# Patient Record
Sex: FEMALE | Race: OTHER | Employment: FULL TIME | URBAN - METROPOLITAN AREA
[De-identification: names, ages, dates, MRNs, and addresses within clinical notes are randomized per-mention and may not be internally consistent; named-entity substitution may affect disease eponyms.]

---

## 2018-07-14 ENCOUNTER — HOSPITAL ENCOUNTER (EMERGENCY)
Facility: HOSPITAL | Age: 18
Discharge: HOME | End: 2018-07-14
Payer: OTHER MISCELLANEOUS

## 2018-07-14 VITALS
DIASTOLIC BLOOD PRESSURE: 68 MMHG | SYSTOLIC BLOOD PRESSURE: 118 MMHG | WEIGHT: 127 LBS | OXYGEN SATURATION: 99 % | HEART RATE: 76 BPM | RESPIRATION RATE: 16 BRPM | HEIGHT: 63 IN | TEMPERATURE: 98 F | BODY MASS INDEX: 22.5 KG/M2

## 2018-07-14 DIAGNOSIS — IMO0001 NEEDLESTICK INJURY OF FINGER, INITIAL ENCOUNTER: Primary | ICD-10-CM

## 2018-07-14 PROCEDURE — 99283 EMERGENCY DEPT VISIT LOW MDM: CPT

## 2018-07-14 PROCEDURE — 36415 COLL VENOUS BLD VENIPUNCTURE: CPT

## 2018-07-14 PROCEDURE — 87389 HIV-1 AG W/HIV-1&-2 AB AG IA: CPT

## 2018-07-14 PROCEDURE — 86706 HEP B SURFACE ANTIBODY: CPT

## 2018-07-14 PROCEDURE — 86803 HEPATITIS C AB TEST: CPT

## 2018-07-14 NOTE — ED INITIAL ASSESSMENT (HPI)
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

## 2018-07-14 NOTE — ED NOTES
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 The patient reports that she immediately washed the exposure site with soap and warm water.  Patient h

## 2018-07-14 NOTE — ED PROVIDER NOTES
Patient Seen in: [facility] AND [facility] Emergency Department    History   Patient presents with:  Exposure,Chem Occupational (infectious)    Stated Complaint: needle stick (right thumb)    Patient presents into the emergency room for evaluation of exposure to Pulse 76   Temp 98 °F (36.7 °C)   Resp 16   Ht 160 cm (5' 3\")   Wt 57.6 kg   LMP 07/13/2018   SpO2 99%   BMI 22.50 kg/m²         Physical Exam   Constitutional: She is oriented to person, place, and time. She appears well-developed and well-nourished.  [facility] with that plan of care.           Disposition and Plan     Clinical Impression:  Needlestick injury of finger, initial encounter  (primary encounter diagnosis)    Disposition:  Discharge  7/14/2018  5:40 pm    Follow-up:  DO Zaina Cheema

## 2018-07-16 LAB
HBV SURFACE AB SER-ACNC: <3.1 MIU/ML (ref ?–10)
HBV SURFACE AG SERPL QL IA: NONREACTIVE
HCV AB SERPL QL IA: NONREACTIVE
HIV1+2 AB SERPL QL IA: NONREACTIVE

## 2018-07-26 ENCOUNTER — OFFICE VISIT (OUTPATIENT)
Dept: FAMILY MEDICINE CLINIC | Facility: CLINIC | Age: 18
End: 2018-07-26

## 2018-07-26 VITALS
HEART RATE: 94 BPM | SYSTOLIC BLOOD PRESSURE: 108 MMHG | DIASTOLIC BLOOD PRESSURE: 71 MMHG | BODY MASS INDEX: 20.55 KG/M2 | HEIGHT: 63 IN | TEMPERATURE: 98 F | WEIGHT: 116 LBS

## 2018-07-26 DIAGNOSIS — Z23 NEED FOR HEPATITIS B VACCINATION: ICD-10-CM

## 2018-07-26 DIAGNOSIS — IMO0001 NEEDLESTICK INJURY OF FINGER, SUBSEQUENT ENCOUNTER: Primary | ICD-10-CM

## 2018-07-26 DIAGNOSIS — F32.A ANXIETY AND DEPRESSION: ICD-10-CM

## 2018-07-26 DIAGNOSIS — F41.9 ANXIETY AND DEPRESSION: ICD-10-CM

## 2018-07-26 PROCEDURE — 90471 IMMUNIZATION ADMIN: CPT | Performed by: FAMILY MEDICINE

## 2018-07-26 PROCEDURE — 90746 HEPB VACCINE 3 DOSE ADULT IM: CPT | Performed by: FAMILY MEDICINE

## 2018-07-26 PROCEDURE — 99214 OFFICE O/P EST MOD 30 MIN: CPT | Performed by: FAMILY MEDICINE

## 2018-07-26 NOTE — PROGRESS NOTES
HPI:    Josh Flynn is a 19year old female presents to clinic for ER follow up. On 7/14, patient accidentally had a needle stick injury at work, needle poked her left thumb, works in a dental office.  Went to the ER and had labs drawn, was told to m prophylaxis, she declined. To repeat labs in 3 months. Anxiety and depression  - spoke to patient about birth control vs SSRI. Notes that both of these options make her nervous.  Referred to Dr. Lilly Mercado for further management    Patient verbalized Connie Echols

## 2018-08-27 ENCOUNTER — NURSE ONLY (OUTPATIENT)
Dept: FAMILY MEDICINE CLINIC | Facility: CLINIC | Age: 18
End: 2018-08-27
Payer: OTHER MISCELLANEOUS

## 2018-08-27 DIAGNOSIS — Z23 NEED FOR HEPATITIS B VACCINATION: Primary | ICD-10-CM

## 2018-08-27 PROCEDURE — 90746 HEPB VACCINE 3 DOSE ADULT IM: CPT | Performed by: FAMILY MEDICINE

## 2018-08-27 PROCEDURE — 90471 IMMUNIZATION ADMIN: CPT | Performed by: FAMILY MEDICINE

## 2018-08-27 NOTE — PROGRESS NOTES
Patient arrived for 2nd Hepatitis B injection. Administered IM in left deltoid. Patient tolerated well. NO s/s of distress noted. Patient advised to return in January 2019 for 3rd and final injection. Patient verbalized understanding.

## 2018-09-26 ENCOUNTER — OFFICE VISIT (OUTPATIENT)
Dept: FAMILY MEDICINE CLINIC | Facility: CLINIC | Age: 18
End: 2018-09-26

## 2018-09-26 ENCOUNTER — APPOINTMENT (OUTPATIENT)
Dept: LAB | Age: 18
End: 2018-09-26
Attending: FAMILY MEDICINE
Payer: COMMERCIAL

## 2018-09-26 VITALS
WEIGHT: 116 LBS | HEART RATE: 80 BPM | HEIGHT: 63.75 IN | SYSTOLIC BLOOD PRESSURE: 99 MMHG | DIASTOLIC BLOOD PRESSURE: 65 MMHG | RESPIRATION RATE: 16 BRPM | BODY MASS INDEX: 20.05 KG/M2 | TEMPERATURE: 98 F

## 2018-09-26 DIAGNOSIS — Z00.00 ROUTINE PHYSICAL EXAMINATION: Primary | ICD-10-CM

## 2018-09-26 DIAGNOSIS — Z00.00 ROUTINE PHYSICAL EXAMINATION: ICD-10-CM

## 2018-09-26 DIAGNOSIS — R10.11 RUQ PAIN: ICD-10-CM

## 2018-09-26 LAB
ALBUMIN SERPL BCP-MCNC: 4.6 G/DL (ref 3.5–4.8)
ALBUMIN/GLOB SERPL: 1.3 {RATIO} (ref 1–2)
ALP SERPL-CCNC: 61 U/L (ref 39–325)
ALT SERPL-CCNC: 13 U/L (ref 14–54)
ANION GAP SERPL CALC-SCNC: 9 MMOL/L (ref 0–18)
AST SERPL-CCNC: 23 U/L (ref 15–41)
BILIRUB SERPL-MCNC: 0.6 MG/DL (ref 0.3–1.2)
BUN SERPL-MCNC: 10 MG/DL (ref 8–20)
BUN/CREAT SERPL: 16.9 (ref 10–20)
CALCIUM SERPL-MCNC: 10.2 MG/DL (ref 8.5–10.5)
CHLORIDE SERPL-SCNC: 100 MMOL/L (ref 95–110)
CO2 SERPL-SCNC: 28 MMOL/L (ref 22–32)
CREAT SERPL-MCNC: 0.59 MG/DL (ref 0.5–1.5)
ERYTHROCYTE [DISTWIDTH] IN BLOOD BY AUTOMATED COUNT: 13 % (ref 11–15)
GLOBULIN PLAS-MCNC: 3.5 G/DL (ref 2.5–3.7)
GLUCOSE SERPL-MCNC: 70 MG/DL (ref 70–99)
HCT VFR BLD AUTO: 40.1 % (ref 35–48)
HGB BLD-MCNC: 13.6 G/DL (ref 12–16)
MCH RBC QN AUTO: 32.4 PG (ref 27–32)
MCHC RBC AUTO-ENTMCNC: 33.9 G/DL (ref 32–37)
MCV RBC AUTO: 95.4 FL (ref 80–100)
OSMOLALITY UR CALC.SUM OF ELEC: 281 MOSM/KG (ref 275–295)
PATIENT FASTING: NO
PLATELET # BLD AUTO: 256 K/UL (ref 140–400)
PMV BLD AUTO: 11 FL (ref 7.4–10.3)
POTASSIUM SERPL-SCNC: 3.7 MMOL/L (ref 3.3–5.1)
PROT SERPL-MCNC: 8.1 G/DL (ref 5.9–8.4)
RBC # BLD AUTO: 4.2 M/UL (ref 3.7–5.4)
SODIUM SERPL-SCNC: 137 MMOL/L (ref 136–144)
TSH SERPL-ACNC: 1.22 UIU/ML (ref 0.45–5.33)
WBC # BLD AUTO: 6.3 K/UL (ref 4–11)

## 2018-09-26 PROCEDURE — 36415 COLL VENOUS BLD VENIPUNCTURE: CPT

## 2018-09-26 PROCEDURE — 84443 ASSAY THYROID STIM HORMONE: CPT

## 2018-09-26 PROCEDURE — 80053 COMPREHEN METABOLIC PANEL: CPT

## 2018-09-26 PROCEDURE — 99395 PREV VISIT EST AGE 18-39: CPT | Performed by: FAMILY MEDICINE

## 2018-09-26 PROCEDURE — 85027 COMPLETE CBC AUTOMATED: CPT

## 2018-09-26 NOTE — PROGRESS NOTES
HPI:  19 yr old female who present for physical. Working as dental assistant and doing internship. Hoping to be  in the future. Limited exercise. Tries to eat healthy. States she is craving junk food lately. Declined flu shot.      Feels pre Normal lips, teeth, and gums. Oropharynx normal.  Neck supple. Good ROM. No LAD.   Thyroid normal.  CV:  Regular rate and rhythm; no murmurs  Lungs:  Clear to ausculation; good aeration               No wheezes, rales or rhonchi  Abd: soft, mild tenderne

## 2018-11-12 ENCOUNTER — OFFICE VISIT (OUTPATIENT)
Dept: FAMILY MEDICINE CLINIC | Facility: CLINIC | Age: 18
End: 2018-11-12

## 2018-11-12 ENCOUNTER — NURSE TRIAGE (OUTPATIENT)
Dept: OTHER | Age: 18
End: 2018-11-12

## 2018-11-12 VITALS
HEART RATE: 76 BPM | TEMPERATURE: 98 F | OXYGEN SATURATION: 98 % | DIASTOLIC BLOOD PRESSURE: 60 MMHG | SYSTOLIC BLOOD PRESSURE: 98 MMHG

## 2018-11-12 DIAGNOSIS — B30.9 VIRAL CONJUNCTIVITIS OF BOTH EYES: Primary | ICD-10-CM

## 2018-11-12 PROCEDURE — 99203 OFFICE O/P NEW LOW 30 MIN: CPT

## 2018-11-12 RX ORDER — TOBRAMYCIN 3 MG/ML
SOLUTION/ DROPS OPHTHALMIC
Qty: 10 ML | Refills: 0 | Status: SHIPPED | OUTPATIENT
Start: 2018-11-12 | End: 2019-06-18

## 2018-11-12 NOTE — PROGRESS NOTES
Ayah Shoemaker is a 19year old female. CHIEF COMPLAINT:   Patient presents with:  Eye Problem: right, then left eye irritation over last 2 days.  Works as dental assistant        HPI:   Ayah Shoemaker is a 19year old female who presents with chief co GENERAL: well developed, well nourished,in no apparent distress  SKIN: no facial or eyelid  rashes,no suspicious lesions  EYES: PERRLA, EOMI, left palpebral conjunctiva erythema, mildly injected( peripheral) left bulbar conjunctiva, no discharge seen.  Righ Infections are caused by viruses or germs (bacteria). Treatment includes keeping your eyes and hands clean. Your healthcare provider may prescribe eye drops, and tell you to stay home from work or school if you’re contagious.  Untreated infections can be se © 9163-9290 [employer] [address]. [address] [facility], [facility] [address]. All rights reserved. This information is not intended as a substitute for professional medical care. Always follow your healthcare professional's instructions.

## 2018-11-12 NOTE — TELEPHONE ENCOUNTER
Action Requested: Summary for Provider     []  Critical Lab, Recommendations Needed  [] Need Additional Advice  []   FYI    []   Need Orders  [] Need Medications Sent to Pharmacy  []  Other     SUMMARY: advised pt go to [facility] as no appt available

## 2018-11-13 NOTE — TELEPHONE ENCOUNTER
Patient seen in walk in clinic and diagnosed with Viral conjunctivitis of both eyes . Patient given script for Tobramycin Sulfate 0.3 % Ophthalmic Solution and advised to follow-up with Dr. Grace Ramos in 2-3 days is symptoms not improving.

## 2019-02-05 ENCOUNTER — HOSPITAL ENCOUNTER (OUTPATIENT)
Dept: ULTRASOUND IMAGING | Age: 19
Discharge: HOME | End: 2019-02-05
Attending: FAMILY MEDICINE
Payer: COMMERCIAL

## 2019-02-05 DIAGNOSIS — R10.11 RUQ PAIN: ICD-10-CM

## 2019-02-05 PROCEDURE — 76705 ECHO EXAM OF ABDOMEN: CPT | Performed by: FAMILY MEDICINE

## 2019-05-28 ENCOUNTER — TELEPHONE (OUTPATIENT)
Dept: FAMILY MEDICINE CLINIC | Facility: CLINIC | Age: 19
End: 2019-05-28

## 2019-05-28 NOTE — TELEPHONE ENCOUNTER
Pt called in requested her vaccination but Pt did not know which vaccination is due next    Please advise and call for order  Per Pt can call her anytime

## 2019-06-13 ENCOUNTER — TELEPHONE (OUTPATIENT)
Dept: FAMILY MEDICINE CLINIC | Facility: CLINIC | Age: 19
End: 2019-06-13

## 2019-06-13 NOTE — TELEPHONE ENCOUNTER
Pt called in requesting to come in for over due vaccinations and any vaccinations needed to travel to [place]. Pt stated that she will be leaving on 6/19. Pt is requesting to come in as soon as possible.   Please advise

## 2019-06-18 ENCOUNTER — OFFICE VISIT (OUTPATIENT)
Dept: FAMILY MEDICINE CLINIC | Facility: CLINIC | Age: 19
End: 2019-06-18

## 2019-06-18 VITALS
SYSTOLIC BLOOD PRESSURE: 99 MMHG | TEMPERATURE: 98 F | BODY MASS INDEX: 19.77 KG/M2 | HEART RATE: 86 BPM | DIASTOLIC BLOOD PRESSURE: 65 MMHG | WEIGHT: 114.38 LBS | HEIGHT: 63.75 IN

## 2019-06-18 DIAGNOSIS — Z71.84 TRAVEL ADVICE ENCOUNTER: Primary | ICD-10-CM

## 2019-06-18 PROCEDURE — 90472 IMMUNIZATION ADMIN EACH ADD: CPT | Performed by: FAMILY MEDICINE

## 2019-06-18 PROCEDURE — 99213 OFFICE O/P EST LOW 20 MIN: CPT | Performed by: FAMILY MEDICINE

## 2019-06-18 PROCEDURE — 90471 IMMUNIZATION ADMIN: CPT | Performed by: FAMILY MEDICINE

## 2019-06-18 PROCEDURE — 90746 HEPB VACCINE 3 DOSE ADULT IM: CPT | Performed by: FAMILY MEDICINE

## 2019-06-18 PROCEDURE — 99212 OFFICE O/P EST SF 10 MIN: CPT | Performed by: FAMILY MEDICINE

## 2019-06-18 PROCEDURE — 90715 TDAP VACCINE 7 YRS/> IM: CPT | Performed by: FAMILY MEDICINE

## 2019-06-18 NOTE — PROGRESS NOTES
HPI:    Santi Jang is a 22year old female presents to clinic for travel  advice. Is traveling to [place] on Thursday for 2 weeks. Is requesting all vaccines and medications that she needs. Also needs third hepatitis B booster.       HISTORY: will start a course tonight  -Declines malaria prophylaxis, advised her to use mosquito repellent if needed  -Clean food/water practices advised  -To take all necessary over-the-counter medications like Tylenol, Motrin, etc. with her.  -to Follow-up when s

## 2019-07-18 ENCOUNTER — OFFICE VISIT (OUTPATIENT)
Dept: FAMILY MEDICINE CLINIC | Facility: CLINIC | Age: 19
End: 2019-07-18

## 2019-07-18 VITALS
DIASTOLIC BLOOD PRESSURE: 68 MMHG | SYSTOLIC BLOOD PRESSURE: 103 MMHG | HEART RATE: 79 BPM | RESPIRATION RATE: 18 BRPM | WEIGHT: 114 LBS | TEMPERATURE: 99 F | BODY MASS INDEX: 19.7 KG/M2 | HEIGHT: 63.75 IN

## 2019-07-18 DIAGNOSIS — Z00.00 ROUTINE PHYSICAL EXAMINATION: Primary | ICD-10-CM

## 2019-07-18 DIAGNOSIS — B35.1 TOENAIL FUNGUS: ICD-10-CM

## 2019-07-18 DIAGNOSIS — Z01.419 ROUTINE GYNECOLOGICAL EXAMINATION: ICD-10-CM

## 2019-07-18 PROCEDURE — 99395 PREV VISIT EST AGE 18-39: CPT | Performed by: FAMILY MEDICINE

## 2019-07-18 NOTE — PROGRESS NOTES
HPI:  22 yr old female who presents for physical. 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 Started doing yoga in the mornings. Eating healthy. Went to [facility] for 2 weeks. States right foot feels weird at times.  States right great t ausculation; good aeration               No wheezes, rales or rhonchi  Abd: soft, non-tender, non-distended          Normal bowel sounds; no masses          No hepatosplenomegaly  Breasts:  Normal appearance bilateral.  No masses or lesions noted.   Normal

## 2019-07-19 LAB
C TRACH DNA SPEC QL NAA+PROBE: NEGATIVE
N GONORRHOEA DNA SPEC QL NAA+PROBE: NEGATIVE

## 2019-08-22 ENCOUNTER — OFFICE VISIT (OUTPATIENT)
Dept: FAMILY MEDICINE CLINIC | Facility: CLINIC | Age: 19
End: 2019-08-22

## 2019-08-22 VITALS
HEIGHT: 63 IN | DIASTOLIC BLOOD PRESSURE: 64 MMHG | RESPIRATION RATE: 16 BRPM | HEART RATE: 88 BPM | TEMPERATURE: 99 F | OXYGEN SATURATION: 98 % | SYSTOLIC BLOOD PRESSURE: 98 MMHG | BODY MASS INDEX: 20.2 KG/M2 | WEIGHT: 114 LBS

## 2019-08-22 DIAGNOSIS — J02.9 ACUTE PHARYNGITIS, UNSPECIFIED ETIOLOGY: ICD-10-CM

## 2019-08-22 DIAGNOSIS — J02.9 SORE THROAT: Primary | ICD-10-CM

## 2019-08-22 LAB
CONTROL LINE PRESENT WITH A CLEAR BACKGROUND (YES/NO): YES YES/NO
STREP GRP A CUL-SCR: NEGATIVE

## 2019-08-22 PROCEDURE — 99213 OFFICE O/P EST LOW 20 MIN: CPT | Performed by: NURSE PRACTITIONER

## 2019-08-22 PROCEDURE — 87081 CULTURE SCREEN ONLY: CPT | Performed by: NURSE PRACTITIONER

## 2019-08-22 PROCEDURE — 87880 STREP A ASSAY W/OPTIC: CPT | Performed by: NURSE PRACTITIONER

## 2019-08-22 NOTE — PROGRESS NOTES
CHIEF COMPLAINT:   Patient presents with:  Sore Throat: Sore throat, left tonsil feels swollen, symptoms x 4 days      HPI:   Bella Seaman is a 22year old female presents to clinic with complaint of sore throat. Patient has had for 4 days.  Patient r GENERAL: well developed, well nourished, in no apparent distress  SKIN: no rashes, no suspicious lesions  HEAD: atraumatic, normocephalic  EYES: conjunctiva clear, EOM intact  EARS: TM's clear, non-injected, no bulging, retraction, or fluid bilaterally  NO Sore throats happen for many reasons, such as colds, allergies, and infections caused by viruses or bacteria. In any case, your throat becomes red and sore.  Your goal for self-care is to reduce your discomfort while giving your throat a chance to heal.  Mo · White spots on the throat  · Great difficulty swallowing  · Trouble breathing  · A skin rash  · Recent exposure to someone else with strep bacteria  · Severe hoarseness and swollen glands in the neck or jaw  Date Last Reviewed: 8/1/2016 © 2000-2019 The

## 2019-08-24 ENCOUNTER — TELEPHONE (OUTPATIENT)
Dept: FAMILY MEDICINE CLINIC | Facility: CLINIC | Age: 19
End: 2019-08-24

## 2019-08-24 RX ORDER — AMOXICILLIN 500 MG/1
500 CAPSULE ORAL 2 TIMES DAILY
Qty: 20 CAPSULE | Refills: 0 | Status: SHIPPED | OUTPATIENT
Start: 2019-08-24 | End: 2019-09-03

## 2019-08-24 NOTE — TELEPHONE ENCOUNTER
VM left x 5 with call center number and clinic hours for a return call, to order medication for + GABHS. My chart message also sent. After several hours of awaiting call: Pts emergency contact phoned:  Mother states that she will notify daughter to  A

## 2019-10-10 ENCOUNTER — OFFICE VISIT (OUTPATIENT)
Dept: FAMILY MEDICINE CLINIC | Facility: CLINIC | Age: 19
End: 2019-10-10

## 2019-10-10 VITALS
WEIGHT: 121.81 LBS | SYSTOLIC BLOOD PRESSURE: 101 MMHG | TEMPERATURE: 98 F | DIASTOLIC BLOOD PRESSURE: 62 MMHG | RESPIRATION RATE: 16 BRPM | HEART RATE: 73 BPM | BODY MASS INDEX: 22 KG/M2

## 2019-10-10 DIAGNOSIS — M25.511 ACUTE PAIN OF RIGHT SHOULDER: ICD-10-CM

## 2019-10-10 DIAGNOSIS — M25.531 RIGHT WRIST PAIN: Primary | ICD-10-CM

## 2019-10-10 PROCEDURE — 99213 OFFICE O/P EST LOW 20 MIN: CPT | Performed by: FAMILY MEDICINE

## 2019-10-10 RX ORDER — METHYLPREDNISOLONE 4 MG/1
TABLET ORAL
Qty: 1 KIT | Refills: 0 | Status: SHIPPED | OUTPATIENT
Start: 2019-10-10 | End: 2019-12-05

## 2019-10-10 NOTE — PROGRESS NOTES
HPI: Tre Andujar is a 22year old female who presents for right hand pain for 2 weeks. Worse in the past few days. Has numbness in first 3 fingers. 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 Has wrist pain as well.  Wearing thumb spina splint which is

## 2019-12-05 ENCOUNTER — OFFICE VISIT (OUTPATIENT)
Dept: FAMILY MEDICINE CLINIC | Facility: CLINIC | Age: 19
End: 2019-12-05

## 2019-12-05 VITALS
DIASTOLIC BLOOD PRESSURE: 67 MMHG | HEART RATE: 98 BPM | RESPIRATION RATE: 16 BRPM | SYSTOLIC BLOOD PRESSURE: 105 MMHG | OXYGEN SATURATION: 98 % | TEMPERATURE: 99 F

## 2019-12-05 DIAGNOSIS — R39.9 URINARY TRACT INFECTION SYMPTOMS: Primary | ICD-10-CM

## 2019-12-05 DIAGNOSIS — R10.84 ABDOMINAL PAIN, CHRONIC, GENERALIZED: ICD-10-CM

## 2019-12-05 DIAGNOSIS — G89.29 ABDOMINAL PAIN, CHRONIC, GENERALIZED: ICD-10-CM

## 2019-12-05 DIAGNOSIS — Z91.89 AT RISK FOR SEXUALLY TRANSMITTED DISEASE DUE TO UNPROTECTED SEX: ICD-10-CM

## 2019-12-05 PROCEDURE — 87186 SC STD MICRODIL/AGAR DIL: CPT | Performed by: NURSE PRACTITIONER

## 2019-12-05 PROCEDURE — 87591 N.GONORRHOEAE DNA AMP PROB: CPT | Performed by: NURSE PRACTITIONER

## 2019-12-05 PROCEDURE — 87077 CULTURE AEROBIC IDENTIFY: CPT | Performed by: NURSE PRACTITIONER

## 2019-12-05 PROCEDURE — 87086 URINE CULTURE/COLONY COUNT: CPT | Performed by: NURSE PRACTITIONER

## 2019-12-05 PROCEDURE — 87491 CHLMYD TRACH DNA AMP PROBE: CPT | Performed by: NURSE PRACTITIONER

## 2019-12-05 PROCEDURE — 99213 OFFICE O/P EST LOW 20 MIN: CPT | Performed by: NURSE PRACTITIONER

## 2019-12-05 PROCEDURE — 81003 URINALYSIS AUTO W/O SCOPE: CPT | Performed by: NURSE PRACTITIONER

## 2019-12-05 RX ORDER — SULFAMETHOXAZOLE AND TRIMETHOPRIM 800; 160 MG/1; MG/1
1 TABLET ORAL 2 TIMES DAILY
Qty: 6 TABLET | Refills: 0 | Status: SHIPPED | OUTPATIENT
Start: 2019-12-05 | End: 2019-12-08

## 2019-12-05 NOTE — PROGRESS NOTES
CHIEF COMPLAINT:   Patient presents with:  Urinary Symptoms      HPI:   Bakari Jones is a 22year old female who presents with symptoms of UTI. Complaining of urinary frequency, urgency for last 1-2 days. Denies dysuria.  Symptoms have been worsening Bilirubin Negative Negative    Ketones, UA Negative Negative mg/dL    Spec Gravity 1.020 1.005 - 1.030    Blood Urine Trace-intact Negative    PH Urine 7.0 4.5 - 8.0    Protein Urine Negative Negative/Trace mg/dL    Urobilinogen Urine 0.2 0.0 - 1.9 mg/dL Urine is normally doesn't have any bacteria in it. But bacteria can get into the urinary tract from the skin around the rectum. Or they can travel in the blood from elsewhere in the body.  Once they are in your urinary tract, they can cause infection in the · Procedures such as having a catheter inserted  · Older age  · Not emptying your bladder. This can allow bacteria a chance to grow in your urine.   · Dehydration  · Constipation  · Sex  · Use of a diaphragm for birth control   Treatment  Bladder infections · Urinate right after intercourse to flush out your bladder. · If you use birth control pills and have frequent bladder infections, discuss it with your doctor.   Follow-up care  Call your healthcare provider if all symptoms are not gone after 3 days of tr

## 2020-07-27 ENCOUNTER — OFFICE VISIT (OUTPATIENT)
Dept: FAMILY MEDICINE CLINIC | Facility: CLINIC | Age: 20
End: 2020-07-27

## 2020-07-27 VITALS
HEART RATE: 114 BPM | WEIGHT: 115.38 LBS | DIASTOLIC BLOOD PRESSURE: 75 MMHG | BODY MASS INDEX: 20.45 KG/M2 | SYSTOLIC BLOOD PRESSURE: 109 MMHG | HEIGHT: 63 IN | TEMPERATURE: 98 F

## 2020-07-27 DIAGNOSIS — R10.2 PELVIC PAIN: Primary | ICD-10-CM

## 2020-07-27 PROCEDURE — 3074F SYST BP LT 130 MM HG: CPT | Performed by: FAMILY MEDICINE

## 2020-07-27 PROCEDURE — 3078F DIAST BP <80 MM HG: CPT | Performed by: FAMILY MEDICINE

## 2020-07-27 PROCEDURE — 3008F BODY MASS INDEX DOCD: CPT | Performed by: FAMILY MEDICINE

## 2020-07-27 PROCEDURE — 99213 OFFICE O/P EST LOW 20 MIN: CPT | Performed by: FAMILY MEDICINE

## 2020-07-27 NOTE — PROGRESS NOTES
HPI:    Josh Flynn is a 23year old female presents to clinic with 1 month history of pelvic pain. Patient reports that she had sexual intercourse with a new partner, did use a condom. During sex and afterwards, she felt some pain.   Since then, esha tenderness, no discharge and no friability. No vaginal discharge. Vitals reviewed.       ASSESSMENT/PLAN:   (R10.2) Pelvic pain  (primary encounter diagnosis)  Plan: GENITAL VAGINOSIS SCREEN, CHLAMYDIA/GONOCOCCUS,        BIRDIE, URINE CULTURE, ROUTINE,

## 2020-07-28 LAB
C TRACH DNA SPEC QL NAA+PROBE: NEGATIVE
N GONORRHOEA DNA SPEC QL NAA+PROBE: NEGATIVE

## 2020-07-30 LAB
GENITAL VAGINOSIS SCREEN: NEGATIVE
TRICHOMONAS SCREEN: NEGATIVE

## 2020-08-14 ENCOUNTER — OFFICE VISIT (OUTPATIENT)
Dept: FAMILY MEDICINE CLINIC | Facility: CLINIC | Age: 20
End: 2020-08-14

## 2020-08-14 VITALS
DIASTOLIC BLOOD PRESSURE: 73 MMHG | SYSTOLIC BLOOD PRESSURE: 110 MMHG | WEIGHT: 115.63 LBS | RESPIRATION RATE: 16 BRPM | HEIGHT: 64 IN | TEMPERATURE: 98 F | HEART RATE: 105 BPM | BODY MASS INDEX: 19.74 KG/M2

## 2020-08-14 DIAGNOSIS — R10.84 GENERALIZED ABDOMINAL PAIN: ICD-10-CM

## 2020-08-14 DIAGNOSIS — Z00.00 ROUTINE PHYSICAL EXAMINATION: Primary | ICD-10-CM

## 2020-08-14 DIAGNOSIS — N64.4 BREAST PAIN: ICD-10-CM

## 2020-08-14 DIAGNOSIS — R07.9 CHEST PAIN, UNSPECIFIED TYPE: ICD-10-CM

## 2020-08-14 PROCEDURE — 99395 PREV VISIT EST AGE 18-39: CPT | Performed by: FAMILY MEDICINE

## 2020-08-14 PROCEDURE — 3078F DIAST BP <80 MM HG: CPT | Performed by: FAMILY MEDICINE

## 2020-08-14 PROCEDURE — 3074F SYST BP LT 130 MM HG: CPT | Performed by: FAMILY MEDICINE

## 2020-08-14 PROCEDURE — 3008F BODY MASS INDEX DOCD: CPT | Performed by: FAMILY MEDICINE

## 2020-08-14 NOTE — PROGRESS NOTES
HPI:  23 yr old female who presents for physical. Just started back to work. Does yoga. Rollerblades few times per week as well. Reports she has been having issues with stomach. Saw SK a few weeks ago for yeast infection.  Stomach feels inflamed, worse Oropharynx normal.  Neck supple. Good ROM. No LAD.   Thyroid normal.  CV:  Regular rate and rhythm; no murmurs  Lungs:  Clear to ausculation; good aeration               No wheezes, rales or rhonchi  Abd: soft, non-tender, non-distended          Normal eyal diet.    Generalized abdominal pain    Unclear etiology. Pt is very interested in finding out if she has yeast infection. Will refer to [facility]. Chest pain, unspecified type    Suspect chest wall pain versus pain from fibrodense tissue.

## 2021-05-18 ENCOUNTER — OFFICE VISIT (OUTPATIENT)
Dept: INTEGRATIVE MEDICINE | Facility: CLINIC | Age: 21
End: 2021-05-18

## 2021-05-18 VITALS
DIASTOLIC BLOOD PRESSURE: 54 MMHG | OXYGEN SATURATION: 97 % | WEIGHT: 124.38 LBS | HEIGHT: 64 IN | BODY MASS INDEX: 21.24 KG/M2 | SYSTOLIC BLOOD PRESSURE: 106 MMHG | HEART RATE: 86 BPM

## 2021-05-18 DIAGNOSIS — R10.9 ABDOMINAL PAIN, UNSPECIFIED ABDOMINAL LOCATION: Primary | ICD-10-CM

## 2021-05-18 DIAGNOSIS — K59.00 CONSTIPATION, UNSPECIFIED CONSTIPATION TYPE: ICD-10-CM

## 2021-05-18 DIAGNOSIS — B35.1 ONYCHOMYCOSIS: ICD-10-CM

## 2021-05-18 DIAGNOSIS — G56.00 CARPAL TUNNEL SYNDROME, UNSPECIFIED LATERALITY: ICD-10-CM

## 2021-05-18 PROCEDURE — 99214 OFFICE O/P EST MOD 30 MIN: CPT | Performed by: FAMILY MEDICINE

## 2021-05-18 PROCEDURE — 3078F DIAST BP <80 MM HG: CPT | Performed by: FAMILY MEDICINE

## 2021-05-18 PROCEDURE — 3008F BODY MASS INDEX DOCD: CPT | Performed by: FAMILY MEDICINE

## 2021-05-18 PROCEDURE — 3074F SYST BP LT 130 MM HG: CPT | Performed by: FAMILY MEDICINE

## 2021-05-18 NOTE — PATIENT INSTRUCTIONS
I have complete yimi in the body's ability to heal and transform. The products and items listed below (the “Products”)  and their claims have not been evaluated by the Food and Drug Administration.  Dietary products are not intended to treat, prevent, m preferred. This FIT Test yields many benefits, including: uncovering which foods are causing inflammation and disease, developing a personalized nutritional guide, and improving a patient’s state of health and energy levels.      This test is not covered b

## 2021-05-18 NOTE — PROGRESS NOTES
Mary Kate Rome is a 19year old female. Patient presents with: Integrative Medicine Consult: lower right side abdominal pain - off and on for x years  New Patient      HPI:     Has digestive issues. Getting more bloating with anything she eats.   ZEINA fe file    Social History Narrative      Work - [employer]; dental     Social Determinants of Health  Financial Resource Strain:       Difficulty of Paying Living Expenses:   Food Insecurity:       Worried About [address] in t Status: She is alert and oriented to person, place, and time. Cranial Nerves: No cranial nerve deficit. Gait: Gait is intact.    Psychiatric:         Mood and Affect: Affect normal.         ASSESSMENT AND PLAN:     No visits with results within 6 Dietary products are not intended to treat, prevent, mitigate or cure disease. Ultimately, you must draw your own conclusion as to the efficacy of the Product and immediately stop use of the Products if a negative reaction should arise.   You should always levels. This test is not covered by insurance and costs $100    See https://[employer]. i-drive/ for more information. PLEASE NOTE: As this is a lab test done by an outside company, these results do not pull into your [employer] lab results online.  The

## 2021-05-25 ENCOUNTER — TELEPHONE (OUTPATIENT)
Dept: INTEGRATIVE MEDICINE | Facility: CLINIC | Age: 21
End: 2021-05-25

## 2021-05-25 NOTE — TELEPHONE ENCOUNTER
05/20/2021 ------ lvm for patient to call our office to informing her that antifungal medication is not covered by her insurance and that she can buy this medication otc     05/21/2021 ------ left detailed message informing pt that medication is not covere

## 2021-06-10 ENCOUNTER — MED REC SCAN ONLY (OUTPATIENT)
Dept: INTEGRATIVE MEDICINE | Facility: CLINIC | Age: 21
End: 2021-06-10

## 2021-07-29 ENCOUNTER — LAB ENCOUNTER (OUTPATIENT)
Dept: LAB | Age: 21
End: 2021-07-29
Attending: FAMILY MEDICINE
Payer: COMMERCIAL

## 2021-07-29 DIAGNOSIS — K59.00 CONSTIPATION, UNSPECIFIED CONSTIPATION TYPE: ICD-10-CM

## 2021-07-29 DIAGNOSIS — B35.1 ONYCHOMYCOSIS: ICD-10-CM

## 2021-07-29 DIAGNOSIS — R10.9 ABDOMINAL PAIN, UNSPECIFIED ABDOMINAL LOCATION: ICD-10-CM

## 2021-07-29 PROCEDURE — 86628 CANDIDA ANTIBODY: CPT

## 2021-07-29 PROCEDURE — 36415 COLL VENOUS BLD VENIPUNCTURE: CPT

## 2021-08-01 LAB
CANDIDA ANTIBODY IGA: 0.61 EV
CANDIDA ANTIBODY IGG: 0.85 EV
CANDIDA ANTIBODY IGM: 2.17 EV

## 2021-09-20 ENCOUNTER — OFFICE VISIT (OUTPATIENT)
Dept: INTEGRATIVE MEDICINE | Facility: CLINIC | Age: 21
End: 2021-09-20

## 2021-09-20 VITALS
HEART RATE: 78 BPM | WEIGHT: 120.63 LBS | HEIGHT: 64 IN | BODY MASS INDEX: 20.6 KG/M2 | DIASTOLIC BLOOD PRESSURE: 54 MMHG | SYSTOLIC BLOOD PRESSURE: 96 MMHG | OXYGEN SATURATION: 98 %

## 2021-09-20 DIAGNOSIS — B35.1 ONYCHOMYCOSIS: ICD-10-CM

## 2021-09-20 DIAGNOSIS — T78.1XXA FOOD SENSITIVITY WITH GASTROINTESTINAL SYMPTOMS: ICD-10-CM

## 2021-09-20 DIAGNOSIS — B37.9 CANDIDIASIS: ICD-10-CM

## 2021-09-20 DIAGNOSIS — K59.00 CONSTIPATION, UNSPECIFIED CONSTIPATION TYPE: ICD-10-CM

## 2021-09-20 DIAGNOSIS — Z82.49 FAMILY HISTORY OF HEART VALVE ABNORMALITY: ICD-10-CM

## 2021-09-20 DIAGNOSIS — R10.9 ABDOMINAL PAIN, UNSPECIFIED ABDOMINAL LOCATION: Primary | ICD-10-CM

## 2021-09-20 PROCEDURE — 3008F BODY MASS INDEX DOCD: CPT | Performed by: FAMILY MEDICINE

## 2021-09-20 PROCEDURE — 3074F SYST BP LT 130 MM HG: CPT | Performed by: FAMILY MEDICINE

## 2021-09-20 PROCEDURE — 99214 OFFICE O/P EST MOD 30 MIN: CPT | Performed by: FAMILY MEDICINE

## 2021-09-20 PROCEDURE — 3078F DIAST BP <80 MM HG: CPT | Performed by: FAMILY MEDICINE

## 2021-09-20 NOTE — PROGRESS NOTES
Mary Kate Rome is a 19year old female. Patient presents with: Integrative Medicine Consult: 4 mo f/u      HPI:     Had anxiety after eating jerk chicken in July. Also felt that BP was dropping after eating.      This is improving since changing her die file    Social History Narrative      Work - [employer]; dental     Social Determinants of Health  Financial Resource Strain:       Difficulty of Paying Living Expenses: Not on file  Food Insecurity:       Worried About Running Out o Neurological:      Mental Status: She is alert and oriented to person, place, and time. Cranial Nerves: No cranial nerve deficit. Gait: Gait is intact.    Psychiatric:         Mood and Affect: Affect normal.         ASSESSMENT AND PLAN:     [facility] antibodies. Repeat testing in                              10-14 days may be helpful. 1.00 EV or greater: . ...  Positive - Antibody to Candida                              albicans detected, which may                              indicate a current or p cleared or   approved by the [employer] and Drug Administration. This test was   performed in a CLIA certified laboratory and is intended for   clinical purposes.   Performed By: Tony Cox [address] [place] [address] including, but not limited to its efficacy, benefits or outcomes. Patient agrees to contact his/her healthcare professional and stop use of Products should any reactions arise.      Recommendations:    Siete chips and tortillas      [facility] by Kourtney Nevarez

## 2021-09-20 NOTE — PATIENT INSTRUCTIONS
I have complete yimi in the body's ability to heal and transform. The products and items listed below (the “Products”)  and their claims have not been evaluated by the Food and Drug Administration.  Dietary products are not intended to treat, prevent, m

## 2021-11-18 NOTE — PROGRESS NOTES
HPI: Lang Navarro is a 19year old female who presents for symptoms of depression. Has had anxiety attacks in the past. Anxiety is associated with what she eats mostly. She does have days where she is anxious all days. States it is affecting her life.  She can

## 2022-12-08 ENCOUNTER — OFFICE VISIT (OUTPATIENT)
Dept: FAMILY MEDICINE CLINIC | Facility: CLINIC | Age: 22
End: 2022-12-08
Payer: COMMERCIAL

## 2022-12-08 VITALS
SYSTOLIC BLOOD PRESSURE: 107 MMHG | RESPIRATION RATE: 18 BRPM | HEIGHT: 63.78 IN | WEIGHT: 112.63 LBS | DIASTOLIC BLOOD PRESSURE: 66 MMHG | BODY MASS INDEX: 19.47 KG/M2 | TEMPERATURE: 97 F | HEART RATE: 102 BPM

## 2022-12-08 DIAGNOSIS — Z30.09 COUNSELING FOR BIRTH CONTROL REGARDING INTRAUTERINE DEVICE (IUD): ICD-10-CM

## 2022-12-08 DIAGNOSIS — Z00.00 ROUTINE PHYSICAL EXAMINATION: Primary | ICD-10-CM

## 2022-12-08 DIAGNOSIS — Z01.419 ENCOUNTER FOR ANNUAL ROUTINE GYNECOLOGICAL EXAMINATION: ICD-10-CM

## 2022-12-08 PROCEDURE — 3078F DIAST BP <80 MM HG: CPT | Performed by: FAMILY MEDICINE

## 2022-12-08 PROCEDURE — 3008F BODY MASS INDEX DOCD: CPT | Performed by: FAMILY MEDICINE

## 2022-12-08 PROCEDURE — 99395 PREV VISIT EST AGE 18-39: CPT | Performed by: FAMILY MEDICINE

## 2022-12-08 PROCEDURE — 3074F SYST BP LT 130 MM HG: CPT | Performed by: FAMILY MEDICINE

## 2022-12-09 LAB
C TRACH DNA SPEC QL NAA+PROBE: NEGATIVE
N GONORRHOEA DNA SPEC QL NAA+PROBE: NEGATIVE

## 2022-12-19 LAB
LAST PAP RESULT: NORMAL
PAP HISTORY (OTHER THAN LAST PAP): NORMAL

## 2022-12-24 ENCOUNTER — TELEPHONE (OUTPATIENT)
Dept: FAMILY MEDICINE CLINIC | Facility: CLINIC | Age: 22
End: 2022-12-24

## 2022-12-24 DIAGNOSIS — R87.619 ABNORMAL CERVICAL PAPANICOLAOU SMEAR, UNSPECIFIED ABNORMAL PAP FINDING: Primary | ICD-10-CM

## 2022-12-24 NOTE — TELEPHONE ENCOUNTER
Spoke to lab which stated Ok to add HPV , test code for this is LAB 3015. Stated Provider would have to place order and can have up to 30 days to place. Message sent to Dr Shena Ashley.

## 2022-12-27 LAB — HPV I/H RISK 1 DNA SPEC QL NAA+PROBE: POSITIVE

## 2023-01-07 DIAGNOSIS — R87.612 LGSIL ON PAP SMEAR OF CERVIX: Primary | ICD-10-CM

## 2023-01-24 ENCOUNTER — OFFICE VISIT (OUTPATIENT)
Dept: FAMILY MEDICINE CLINIC | Facility: CLINIC | Age: 23
End: 2023-01-24

## 2023-01-24 VITALS
BODY MASS INDEX: 20.05 KG/M2 | OXYGEN SATURATION: 98 % | DIASTOLIC BLOOD PRESSURE: 76 MMHG | HEIGHT: 63.7 IN | WEIGHT: 116 LBS | HEART RATE: 78 BPM | SYSTOLIC BLOOD PRESSURE: 105 MMHG | RESPIRATION RATE: 19 BRPM

## 2023-01-24 DIAGNOSIS — Z30.430 ENCOUNTER FOR IUD INSERTION: Primary | ICD-10-CM

## 2023-01-24 LAB
CONTROL LINE PRESENT WITH A CLEAR BACKGROUND (YES/NO): YES YES/NO
PREGNANCY TEST, URINE: NEGATIVE

## 2023-01-24 PROCEDURE — 3008F BODY MASS INDEX DOCD: CPT | Performed by: FAMILY MEDICINE

## 2023-01-24 PROCEDURE — 58300 INSERT INTRAUTERINE DEVICE: CPT | Performed by: FAMILY MEDICINE

## 2023-01-24 PROCEDURE — 3074F SYST BP LT 130 MM HG: CPT | Performed by: FAMILY MEDICINE

## 2023-01-24 PROCEDURE — 3078F DIAST BP <80 MM HG: CPT | Performed by: FAMILY MEDICINE

## 2023-01-24 PROCEDURE — 81025 URINE PREGNANCY TEST: CPT | Performed by: FAMILY MEDICINE

## 2023-03-07 ENCOUNTER — OFFICE VISIT (OUTPATIENT)
Dept: FAMILY MEDICINE CLINIC | Facility: CLINIC | Age: 23
End: 2023-03-07

## 2023-03-07 VITALS
DIASTOLIC BLOOD PRESSURE: 65 MMHG | SYSTOLIC BLOOD PRESSURE: 107 MMHG | TEMPERATURE: 98 F | WEIGHT: 118 LBS | RESPIRATION RATE: 16 BRPM | HEART RATE: 101 BPM | BODY MASS INDEX: 20 KG/M2

## 2023-03-07 DIAGNOSIS — Z30.431 IUD CHECK UP: Primary | ICD-10-CM

## 2023-03-07 PROCEDURE — 3078F DIAST BP <80 MM HG: CPT | Performed by: FAMILY MEDICINE

## 2023-03-07 PROCEDURE — 99213 OFFICE O/P EST LOW 20 MIN: CPT | Performed by: FAMILY MEDICINE

## 2023-03-07 PROCEDURE — 3074F SYST BP LT 130 MM HG: CPT | Performed by: FAMILY MEDICINE

## 2023-03-15 ENCOUNTER — TELEMEDICINE (OUTPATIENT)
Dept: TELEHEALTH | Age: 23
End: 2023-03-15

## 2023-03-15 DIAGNOSIS — H93.8X9 PRESSURE SENSATION IN EAR, UNSPECIFIED LATERALITY: Primary | ICD-10-CM

## 2023-03-15 DIAGNOSIS — L29.9 EAR ITCHING: ICD-10-CM

## 2023-03-15 PROCEDURE — 99212 OFFICE O/P EST SF 10 MIN: CPT | Performed by: PHYSICIAN ASSISTANT

## 2023-03-20 ENCOUNTER — OFFICE VISIT (OUTPATIENT)
Dept: FAMILY MEDICINE CLINIC | Facility: CLINIC | Age: 23
End: 2023-03-20
Payer: COMMERCIAL

## 2023-03-20 VITALS
OXYGEN SATURATION: 100 % | SYSTOLIC BLOOD PRESSURE: 109 MMHG | BODY MASS INDEX: 20.91 KG/M2 | DIASTOLIC BLOOD PRESSURE: 65 MMHG | WEIGHT: 118 LBS | TEMPERATURE: 98 F | HEART RATE: 82 BPM | RESPIRATION RATE: 18 BRPM | HEIGHT: 63 IN

## 2023-03-20 DIAGNOSIS — H66.91 ACUTE RIGHT OTITIS MEDIA: Primary | ICD-10-CM

## 2023-03-20 PROCEDURE — 3078F DIAST BP <80 MM HG: CPT

## 2023-03-20 PROCEDURE — 3074F SYST BP LT 130 MM HG: CPT

## 2023-03-20 PROCEDURE — 99213 OFFICE O/P EST LOW 20 MIN: CPT

## 2023-03-20 PROCEDURE — 3008F BODY MASS INDEX DOCD: CPT

## 2023-03-20 RX ORDER — AMOXICILLIN 875 MG/1
875 TABLET, COATED ORAL 2 TIMES DAILY
Qty: 14 TABLET | Refills: 0 | Status: SHIPPED | OUTPATIENT
Start: 2023-03-20 | End: 2023-03-27

## 2023-12-19 ENCOUNTER — OFFICE VISIT (OUTPATIENT)
Dept: FAMILY MEDICINE CLINIC | Facility: CLINIC | Age: 23
End: 2023-12-19
Payer: COMMERCIAL

## 2023-12-19 VITALS
TEMPERATURE: 98 F | HEIGHT: 63 IN | SYSTOLIC BLOOD PRESSURE: 120 MMHG | DIASTOLIC BLOOD PRESSURE: 70 MMHG | OXYGEN SATURATION: 100 % | HEART RATE: 97 BPM | BODY MASS INDEX: 22.15 KG/M2 | WEIGHT: 125 LBS | RESPIRATION RATE: 16 BRPM

## 2023-12-19 DIAGNOSIS — R39.9 UTI SYMPTOMS: Primary | ICD-10-CM

## 2023-12-19 DIAGNOSIS — Z11.3 SCREENING EXAMINATION FOR STI: ICD-10-CM

## 2023-12-19 LAB
APPEARANCE: CLEAR
BILIRUBIN: NEGATIVE
GLUCOSE (URINE DIPSTICK): NEGATIVE MG/DL
KETONES (URINE DIPSTICK): 40 MG/DL
MULTISTIX LOT#: ABNORMAL NUMERIC
NITRITE, URINE: NEGATIVE
OCCULT BLOOD: NEGATIVE
PH, URINE: 6 (ref 4.5–8)
PROTEIN (URINE DIPSTICK): NEGATIVE MG/DL
SPECIFIC GRAVITY: 1.02 (ref 1–1.03)
URINE-COLOR: YELLOW
UROBILINOGEN,SEMI-QN: 0.2 MG/DL (ref 0–1.9)

## 2023-12-19 PROCEDURE — 3074F SYST BP LT 130 MM HG: CPT | Performed by: NURSE PRACTITIONER

## 2023-12-19 PROCEDURE — 3078F DIAST BP <80 MM HG: CPT | Performed by: NURSE PRACTITIONER

## 2023-12-19 PROCEDURE — 87591 N.GONORRHOEAE DNA AMP PROB: CPT | Performed by: NURSE PRACTITIONER

## 2023-12-19 PROCEDURE — 81003 URINALYSIS AUTO W/O SCOPE: CPT | Performed by: NURSE PRACTITIONER

## 2023-12-19 PROCEDURE — 87491 CHLMYD TRACH DNA AMP PROBE: CPT | Performed by: NURSE PRACTITIONER

## 2023-12-19 PROCEDURE — 3008F BODY MASS INDEX DOCD: CPT | Performed by: NURSE PRACTITIONER

## 2023-12-19 PROCEDURE — 87086 URINE CULTURE/COLONY COUNT: CPT | Performed by: NURSE PRACTITIONER

## 2023-12-19 PROCEDURE — 99213 OFFICE O/P EST LOW 20 MIN: CPT | Performed by: NURSE PRACTITIONER

## 2023-12-19 RX ORDER — NITROFURANTOIN 25; 75 MG/1; MG/1
100 CAPSULE ORAL 2 TIMES DAILY
Qty: 10 CAPSULE | Refills: 0 | Status: SHIPPED | OUTPATIENT
Start: 2023-12-19 | End: 2023-12-24

## 2023-12-19 NOTE — PATIENT INSTRUCTIONS
Macrobid ordered to treat UTI symptoms while culture pending, will contact pt if she should stop antibiotic through [address] For constipation, add fiber product like Benefiber, push fluids and add daily exercise  For issues with vaginal yeast while on  vacation, use over the counter Monistat 7 products if vaginal itching or thick, white, clumpy vaginal discharge is noted.

## 2023-12-20 LAB
C TRACH DNA SPEC QL NAA+PROBE: NEGATIVE
N GONORRHOEA DNA SPEC QL NAA+PROBE: NEGATIVE

## 2024-01-17 ENCOUNTER — OFFICE VISIT (OUTPATIENT)
Dept: FAMILY MEDICINE CLINIC | Facility: CLINIC | Age: 24
End: 2024-01-17
Payer: COMMERCIAL

## 2024-01-17 VITALS
WEIGHT: 123 LBS | HEART RATE: 108 BPM | TEMPERATURE: 98 F | SYSTOLIC BLOOD PRESSURE: 110 MMHG | RESPIRATION RATE: 16 BRPM | DIASTOLIC BLOOD PRESSURE: 69 MMHG | BODY MASS INDEX: 22 KG/M2

## 2024-01-17 DIAGNOSIS — M26.609 TMJ DISEASE: ICD-10-CM

## 2024-01-17 DIAGNOSIS — J01.00 ACUTE NON-RECURRENT MAXILLARY SINUSITIS: ICD-10-CM

## 2024-01-17 DIAGNOSIS — R42 DIZZINESS: Primary | ICD-10-CM

## 2024-01-17 LAB
ALBUMIN SERPL-MCNC: 4.7 G/DL (ref 3.2–4.8)
ALBUMIN/GLOB SERPL: 1.4 {RATIO} (ref 1–2)
ALP LIVER SERPL-CCNC: 58 U/L
ALT SERPL-CCNC: 13 U/L
ANION GAP SERPL CALC-SCNC: 6 MMOL/L (ref 0–18)
AST SERPL-CCNC: 21 U/L (ref ?–34)
BILIRUB SERPL-MCNC: 0.6 MG/DL (ref 0.3–1.2)
BUN BLD-MCNC: 10 MG/DL (ref 9–23)
BUN/CREAT SERPL: 16.7 (ref 10–20)
CALCIUM BLD-MCNC: 10 MG/DL (ref 8.7–10.4)
CHLORIDE SERPL-SCNC: 104 MMOL/L (ref 98–112)
CO2 SERPL-SCNC: 29 MMOL/L (ref 21–32)
CREAT BLD-MCNC: 0.6 MG/DL
DEPRECATED RDW RBC AUTO: 42.4 FL (ref 35.1–46.3)
EGFRCR SERPLBLD CKD-EPI 2021: 128 ML/MIN/1.73M2 (ref 60–?)
ERYTHROCYTE [DISTWIDTH] IN BLOOD BY AUTOMATED COUNT: 12.2 % (ref 11–15)
FASTING STATUS PATIENT QL REPORTED: NO
GLOBULIN PLAS-MCNC: 3.3 G/DL (ref 2.8–4.4)
GLUCOSE BLD-MCNC: 74 MG/DL (ref 70–99)
HCT VFR BLD AUTO: 38.1 %
HGB BLD-MCNC: 13 G/DL
MCH RBC QN AUTO: 32.5 PG (ref 26–34)
MCHC RBC AUTO-ENTMCNC: 34.1 G/DL (ref 31–37)
MCV RBC AUTO: 95.3 FL
OSMOLALITY SERPL CALC.SUM OF ELEC: 286 MOSM/KG (ref 275–295)
PLATELET # BLD AUTO: 290 10(3)UL (ref 150–450)
POTASSIUM SERPL-SCNC: 4.3 MMOL/L (ref 3.5–5.1)
PROT SERPL-MCNC: 8 G/DL (ref 5.7–8.2)
RBC # BLD AUTO: 4 X10(6)UL
SODIUM SERPL-SCNC: 139 MMOL/L (ref 136–145)
TSI SER-ACNC: 1.04 MIU/ML (ref 0.55–4.78)
WBC # BLD AUTO: 5.7 X10(3) UL (ref 4–11)

## 2024-01-17 PROCEDURE — 3074F SYST BP LT 130 MM HG: CPT | Performed by: FAMILY MEDICINE

## 2024-01-17 PROCEDURE — 3078F DIAST BP <80 MM HG: CPT | Performed by: FAMILY MEDICINE

## 2024-01-17 PROCEDURE — 99214 OFFICE O/P EST MOD 30 MIN: CPT | Performed by: FAMILY MEDICINE

## 2024-01-17 RX ORDER — AMOXICILLIN AND CLAVULANATE POTASSIUM 875; 125 MG/1; MG/1
1 TABLET, FILM COATED ORAL 2 TIMES DAILY
Qty: 20 TABLET | Refills: 0 | Status: SHIPPED
Start: 2024-01-17 | End: 2024-01-27

## 2024-01-17 NOTE — PROGRESS NOTES
HPI: Ruth is a 23 year old female who presents for dizziness.  Pt was eating overnight oats last Thursday at work and got up and felt uncoordinated.  Got home and saw room spinning. Heart started beating fast.  Felt faint and had headache. Had to use the bathroom as well and had loose stool.  After, she still felt bad. Had a hard time concentrating. The next day, she felt tired and if she moved her head too fast, she got dizzy. Continued to not feel clear on Friday and Saturday.  Had another episode on Saturday night. Heart was beating fast again and she had to go to sleep. Since then, she has been better but her head still feels off.  Stools alternating between hard and soft. Has left side jaw pain radiating into left ear and down left side of neck.     Pt went to [place] and returned on Dec 28th.  Had cold symptoms after.  Tested negative for COVID.     PMH:  No past medical history on file.   Alg:  Motrin cold & and Motrin [ibuprofen]   Meds:   Current Outpatient Medications on File Prior to Visit   Medication Sig Dispense Refill    Acetaminophen (TYLENOL) 325 MG Oral Cap Take by mouth.       No current facility-administered medications on file prior to visit.      Tobacco Use: no    Objective:   Gen: AOx3. NAD.   /69   Pulse 108   Temp 97.9 °F (36.6 °C) (Temporal)   Resp 16   Wt 123 lb (55.8 kg)   LMP 12/22/2023 (Approximate)   BMI 21.79 kg/m²   HEENT: Conjunctive clear.  Tarik ear canals clear.  Tarik TMs intact with good landmarks noted.  Nares patent.  Oral mucous membrane moist.  Normal lips, teeth, and gums.  Oropharynx normal.  Tenderness and crepitation noted to opening of left side of jaw. Neck supple.  Good ROM.  No LAD.    CV:  Regular rate and rhythm; no murmurs  Lungs:  Clear to ausculation; good aeration               No wheezes, rales or rhonchi        Assessment:/Plan:  Encounter Diagnoses   Name Primary?    Dizziness    Unclear etiology.  ?vertigo.  May be secondary to sinus  congestion.    Yes    TMJ disease    Advised icing and anti-inflammatories.  Advised soft foods.        Acute non-recurrent maxillary sinusitis    Will start Augmentin.         Colleen Weiler, DO

## 2024-02-29 ENCOUNTER — OFFICE VISIT (OUTPATIENT)
Dept: OBGYN CLINIC | Facility: CLINIC | Age: 24
End: 2024-02-29
Payer: COMMERCIAL

## 2024-02-29 VITALS
HEIGHT: 63 IN | DIASTOLIC BLOOD PRESSURE: 56 MMHG | BODY MASS INDEX: 21.79 KG/M2 | WEIGHT: 123 LBS | SYSTOLIC BLOOD PRESSURE: 98 MMHG

## 2024-02-29 DIAGNOSIS — Z12.4 SCREENING FOR CERVICAL CANCER: Primary | ICD-10-CM

## 2024-02-29 DIAGNOSIS — R87.612 LGSIL ON PAP SMEAR OF CERVIX: ICD-10-CM

## 2024-02-29 PROCEDURE — 3078F DIAST BP <80 MM HG: CPT | Performed by: OBSTETRICS & GYNECOLOGY

## 2024-02-29 PROCEDURE — 3074F SYST BP LT 130 MM HG: CPT | Performed by: OBSTETRICS & GYNECOLOGY

## 2024-02-29 PROCEDURE — 99203 OFFICE O/P NEW LOW 30 MIN: CPT | Performed by: OBSTETRICS & GYNECOLOGY

## 2024-02-29 PROCEDURE — 87624 HPV HI-RISK TYP POOLED RSLT: CPT | Performed by: OBSTETRICS & GYNECOLOGY

## 2024-02-29 PROCEDURE — 3008F BODY MASS INDEX DOCD: CPT | Performed by: OBSTETRICS & GYNECOLOGY

## 2024-02-29 RX ORDER — LEVONORGESTREL 19.5 MG/1
1 INTRAUTERINE DEVICE INTRAUTERINE ONCE
COMMUNITY
Start: 2023-01-24

## 2024-02-29 NOTE — PROGRESS NOTES
NEW GYN H&P     2024  3:09 PM    Chief Complaint   Patient presents with    New Patient     Referred by PCP for abnormal ( Lsil/+Hpv) pap done 2022    .    HPI: Patient is a 23 year old  LMP 2624 here to establish care - referred by PCP with history of LGSIL PAP 2022. Reports monthly cycles on Kyleena IUD with no gynecologic concerns.     Patient's last menstrual period was 2024 (exact date).    OB History    Para Term  AB Living   0 0 0 0 0 0   SAB IAB Ectopic Multiple Live Births   0 0 0 0 0       GYN hx:    Hx Prior Abnormal Pap: Yes  Pap Date: 22  Pap Result Notes: LSIL/+Hpv  Follow Up Recommendation: referred to gyne for follow up  CONTRACEPTION: Kyleena  LAST MAMMOGRAM: N/A      Current Outpatient Medications   Medication Sig Dispense Refill    levonorgestrel (KYLEENA) 19.5 MG Intrauterine IUD 19,500 mcg (1 each total) by Intrauterine route one time.      Acetaminophen (TYLENOL) 325 MG Oral Cap Take by mouth. (Patient not taking: Reported on 2024)         Past Medical History:   Diagnosis Date    History of use of contraceptive intrauterine device (IUD) 2023    Kyleena IUD 2023-current    HPV (human papilloma virus) infection 2022     Past Surgical History:   Procedure Laterality Date    INSERT INTRAUTERINE DEVICE  2023    Kyleena IUD 2023-current     Allergies   Allergen Reactions    Motrin Cold & HIVES, ITCHING and RASH    Motrin [Ibuprofen] RASH and SWELLING     Tightness with throat and rash      Family History   Problem Relation Age of Onset    Lipids Mother     Colon Cancer Maternal Grandmother     Diabetes Paternal Grandmother     Other (Other) Paternal Grandmother         stroke     Social History     Socioeconomic History    Marital status: Single   Tobacco Use    Smoking status: Never     Passive exposure: Never    Smokeless tobacco: Never   Substance and Sexual Activity    Alcohol use: No     Alcohol/week: 0.0 standard drinks  of alcohol    Drug use: No    Sexual activity: Yes     Birth control/protection: I.U.D.     Comment: Kyleena IUD 01/2023-current     Social History     Social History Narrative    Work - [employer]; dental        ROS:     Review of Systems:  A comprehensive 10 point ROS was completed. All pertinent positives and negatives noted in the HPI.     BP 98/56   Ht 63\"   Wt 123 lb (55.8 kg)   LMP 02/26/2024 (Exact Date)   BMI 21.79 kg/m²     Exam:   GENERAL: well developed, well nourished, in no apparent distress  SKIN: no rashes, no lesions  HEENT: normal  GYNE/:   External Genitalia: normal, no lesions  Urethra: meatus normal   Bladder: well supported  Vagina: menses in vault  Cervix: string 3 cm at os, light bleeding, PAP done  Cul-de-sac: normal  R/V: normal perineum, no hemorrhoids  EXTREMITIES:  nontender without edema      A/P: Patient is 23 year old female     1. LGSIL on Pap smear of cervix  - PAP today  - Treat per results        Total time spent = 15 minutes  >50% visit = face to face counseling and coordination of care        2/29/2024  Brooklynn Taylor MD

## 2024-03-01 LAB — HPV I/H RISK 1 DNA SPEC QL NAA+PROBE: POSITIVE

## 2024-03-14 ENCOUNTER — TELEPHONE (OUTPATIENT)
Dept: OBGYN CLINIC | Facility: CLINIC | Age: 24
End: 2024-03-14

## 2024-03-14 NOTE — TELEPHONE ENCOUNTER
Patient is calling requesting to speak to RN regarding upcoming procedure, per patient has some questions. Please follow up with patient.

## 2024-04-10 ENCOUNTER — OFFICE VISIT (OUTPATIENT)
Dept: OBGYN CLINIC | Facility: CLINIC | Age: 24
End: 2024-04-10
Payer: COMMERCIAL

## 2024-04-10 DIAGNOSIS — R87.810 ASCUS WITH POSITIVE HIGH RISK HPV CERVICAL: Primary | ICD-10-CM

## 2024-04-10 DIAGNOSIS — R87.610 ASCUS WITH POSITIVE HIGH RISK HPV CERVICAL: Primary | ICD-10-CM

## 2024-04-10 LAB
CONTROL LINE PRESENT WITH A CLEAR BACKGROUND (YES/NO): YES YES/NO
KIT LOT #: NORMAL NUMERIC
PREGNANCY TEST, URINE: NEGATIVE

## 2024-04-10 PROCEDURE — 90471 IMMUNIZATION ADMIN: CPT | Performed by: OBSTETRICS & GYNECOLOGY

## 2024-04-10 PROCEDURE — 57454 BX/CURETT OF CERVIX W/SCOPE: CPT | Performed by: OBSTETRICS & GYNECOLOGY

## 2024-04-10 PROCEDURE — 90651 9VHPV VACCINE 2/3 DOSE IM: CPT | Performed by: OBSTETRICS & GYNECOLOGY

## 2024-04-10 PROCEDURE — 81025 URINE PREGNANCY TEST: CPT | Performed by: OBSTETRICS & GYNECOLOGY

## 2024-04-10 PROCEDURE — 88305 TISSUE EXAM BY PATHOLOGIST: CPT | Performed by: OBSTETRICS & GYNECOLOGY

## 2024-04-10 NOTE — PROCEDURES
[facility]  Obstetrics and Gynecology  Colposcopy Procedure Note  Brooklynn Taylor MD    Colpo w/Cx Biopsy and ECC    Indication:   2/29/2024 PAP = ASCUS/HPV+  Pregnancy Results: negative from urine test     Procedure discussed with patient in detail including indication, risk, benefits, alternatives and complications.  Consent signed.    Pre-Procedure:  Cervix prepped with:  Acetic acid    Procedure:  Under satisfactory analgesia, the patient was put in the dorsal lithotomy position.  [place] speculum was placed in the vagina.  Under colposcopic examination the transition zone was seen in entirety.   ECC done then cervical biopsy taken at 11 o'clock where increased acetowhite changes.  Patient tolerated procedure well.      Findings:  Acetowhite epithelium    Plan:  Biopsies to Pathology  Start Gardisil today      Brooklynn Taylor MD  1:58 PM  4/10/2024

## 2024-04-10 NOTE — PROGRESS NOTES
Gardasil 9 Vaccine administered in Right arm IM  Vaccine given at 02:20 pm.  Patient tolerated well no reaction at this time.  2 patient identifiers verified with pt.  Ordering Dr. Taylor     Next injection due 1-2 mos from today  3rd injection due 6 months from today.

## 2024-09-17 ENCOUNTER — TELEPHONE (OUTPATIENT)
Dept: OBGYN CLINIC | Facility: CLINIC | Age: 24
End: 2024-09-17

## 2024-09-17 NOTE — TELEPHONE ENCOUNTER
Per in office tickler file, pt due 10/2024 for repeat pap after colpo done 04/2024. [place] message sent to pt

## 2024-09-25 ENCOUNTER — TELEPHONE (OUTPATIENT)
Dept: OBGYN CLINIC | Facility: CLINIC | Age: 24
End: 2024-09-25

## 2024-09-25 NOTE — TELEPHONE ENCOUNTER
Per in office tickler file, pt due 10/2024 for repeat pap after colpo done 04/2024. This MA left message on pt's voicemail to call back to schedule 6 month repeat pap with Dr. Taylor.

## (undated) NOTE — LETTER
AUTHORIZATION FOR SURGICAL OPERATION OR OTHER PROCEDURE    1. I hereby authorize Dr. Niko Perez, and [address] [place] [address] staff assigned to my case to perform the following operation and/or procedure at the [address] [place] [address]:    _______________________________________________________________________________________________      _______________________________________________________________________________________________    2. My physician has explained the nature and purpose of the operation or other procedure, possible alternative methods of treatment, the risks involved, and the possibility of complication to me. I acknowledge that no guarantee has been made as to the result that may be obtained. 3.  I recognize that, during the course of this operation, or other procedure, unforseen conditions may necessitate additional or different procedure than those listed above. I, therefore, further authorize and request that the above named physician, his/her physician assistants or designees perform such procedures as are, in his/her professional opinion, necessary and desirable. 4.  Any tissue or organs removed in the operation or other procedure may be disposed of by and at the discretion of the [address] [place] [address] and [facility]. 5.  I understand that in the event of a medical emergency, I will be transported by local paramedics to [facility] or other hospital emergency department. 6.  I certify that I have read and fully understand the above consent to operation and/or other procedure. 7.  I acknowledge that my physician has explained sedation/analgesia administration to me including the risks and benefits. I consent to the administration of sedation/analgesia as may be necessary or desirable in the judgement of my physician. Witness signature: ___________________________________________________ Date:  ______/______/_____                    Time:  ________ A. M.  P.M. Patient Name:  ______________________________________________________  (please print)      Patient signature:  ___________________________________________________             Relationship to Patient:           []  Parent    Responsible person                          []  Spouse  In case of minor or                    [] Other  _____________   Incompetent name:  __________________________________________________                               (please print)      _____________      Responsible person  In case of minor or  Incompetent signature:  _______________________________________________    Statement of Physician  My signature below affirms that prior to the time of the procedure, I have explained to the patient and/or his/her guardian, the risks and benefits involved in the proposed treatment and any reasonable alternative to the proposed treatment. I have also explained the risks and benefits involved in the refusal of the proposed treatment and have answered the patient's questions.                         Date:  ______/______/_______  Provider                      Signature:  __________________________________________________________       Time:  ___________ A.M    P.M.

## (undated) NOTE — LETTER
Date: 3/20/2023    Patient Name: Vandana Hamm          To Whom it may concern: This letter has been written at the patient's request. The above patient was seen at the [facility] for treatment of a medical condition. This patient should be excused from attending work on 3/20/2023. The patient may return to work on 3/21/2023.         Sincerely,        JESSICA Gilliam

## (undated) NOTE — LETTER
11/12/2018          To Whom It May Concern:    Bella Jurgen was seen for medical care on 11/12/18   Mary Otero may return to work on 11/13/18. Activity is restricted as follows: None, unless worsens, then stay home 11/13/18.     If you require additional

## (undated) NOTE — ED AVS SNAPSHOT
Arlen Domingo   MRN: [id number]    Department:  [facility]   Date of Visit:  7/14/2018           Disclosure     Insurance plans vary and the physician(s) referred by the ER may not be covered by your plan.  Please contact CARE PHYSICIAN AT ONCE OR RETURN IMMEDIATELY TO THE EMERGENCY DEPARTMENT. If you have been prescribed any medication(s), please fill your prescription right away and begin taking the medication(s) as directed.   If you believe that any of the medications

## (undated) NOTE — LETTER
Ruth Pierre, :2000    CONSENT FOR PROCEDURE/SEDATION    1. I authorize the performance upon Ruth Pierre  the following: Colposcopy with biopsy and Endocervical curettage    2. I authorize Dr. Brooklynn Taylor MD (and whomever is designated as the doctor’s assistant), to perform the above-mentioned procedures.    3. If any unforeseen conditions arise during this procedure calling for additional  procedures, operations, or medications (including anesthesia and blood transfusion), I further request and authorize the doctor to do whatever he/she deems advisable in my interest.    4. I consent to the taking and reproduction of any photographs in the course of this procedure for professional purposes.    5. I consent to the administration of such sedation as may be considered necessary or advisable by the physician responsible for this service, with the exception of ______________________________________________________    6. I have been informed by my doctor of the nature and purpose of this procedure sedation, possible alternative methods of treatment, risk involved and possible complications.      Signature of Patient:_______________________________________________    Signature of person authorized to consent for patient:  _______________________________________________________________    Relationship to patient: ____________________________________________    Witness: _________________________________________ Date:___________     Physician Signature: _______________________________ Date:___________

## (undated) NOTE — MR AVS SNAPSHOT
After Visit Summary   2/29/2024    Ruth Pierre   MRN: [id number]           Visit Information     Date & Time  2/29/2024  2:30 PM Provider  Brooklynn Taylor MD [facility] - OB/GYN Dept. Phone  [phone]      Your Vitals Were  Most recent update: 2/29/2024  3:05 PM    BP   98/56    Ht   63\"    Wt   123 lb    LMP   02/26/2024 (Exact Date)    BMI   21.79 kg/m²         Allergies as of 2/29/2024  Review status set to Review Complete on 2/29/2024       Noted Reaction Type Reactions    Motrin Cold & 05/18/2021    HIVES, ITCHING, RASH    Motrin [ibuprofen] 04/14/2016    RASH, SWELLING    Tightness with throat and rash       Your Current Medications        Dosage    levonorgestrel (KYLEENA) 19.5 MG Intrauterine IUD 19,500 mcg (1 each total) by Intrauterine route one time.    Acetaminophen (TYLENOL) 325 MG Oral Cap Take by mouth.      Diagnoses for This Visit    Screening for cervical cancer   [756773]  -  Primary  LGSIL on Pap smear of cervix   [[id number]]             We Ordered the Following     Normal Orders This Visit    Hpv Dna  High Risk , Thin Prep Collect [[id number] CUSTOM]     THINPREP PAP SMEAR ONLY [[id number] CUSTOM]     ThinPrep PAP Smear [[id number] CUSTOM]     Future Labs/Procedures Expected by Expires    Hpv Dna  High Risk , Thin Prep Collect [[id number] CUSTOM]  2/29/2024 2/28/2025    ThinPrep PAP Smear [[id number] CUSTOM]  2/29/2024 2/28/2025                Did you know that [facility] primary care physicians now offer Video Visits through [employer] for adult patients for a variety of conditions such as allergies, back pain and cold symptoms? Skip the drive and waiting room and online chat with a doctor face-to-face using your web-cam enabled computer or mobile device wherever you are. Video Visits cost $50 and can be paid hassle-free using a credit, debit, or health savings card.  Not active on [employer]? Ask us how to get signed up today!          If you receive  a survey from [employer], please take a few minutes to complete it and provide feedback. We strive to deliver the best patient experience and are looking for ways to make improvements. Your feedback will help us do so. For more information on [employer], please visit [email or web address]           No text in SmartText           No text in SmartText